# Patient Record
(demographics unavailable — no encounter records)

---

## 2024-11-11 NOTE — HISTORY OF PRESENT ILLNESS
[FreeTextEntry8] : Patient presents as follow-up from clinic exam.  Patient went to the clinic on 11/7/2024, DX with laryngitis/sore throat and given Medrol Dosepak.  Patient states her voice and throat are better, she does feel mucus/PND, no fever.  Patient reports home COVID test prior to clinic exam as negative.  Patient would like to be checked out to ensure she does not need antibiotic.

## 2024-11-11 NOTE — ASSESSMENT
[FreeTextEntry1] : No signs of bacterial infection on today's exam.Patient advised to rest/increase fluids and use supportive therapy. Patient will call if symptoms persist or worsen and return to the office as scheduled for regular followup.   Dr. Terry was present in office building while I examined patient

## 2025-01-08 NOTE — HISTORY OF PRESENT ILLNESS
[FreeTextEntry8] : Patient presents complaining of chest tightness for about 5 days.  Patient feels it in the upper portion of her chest, just below her throat area.  Patient states "Am I havind a heart attack".  Patient states about 2 hours ago she developed mild and insignificant cough.  Patient denies dyspnea/calf pain/edema.  Patient does have anxiety and is on Zoloft with lorazepam as needed but does not feel this is anxiety related.  Patient does get GERD of which she takes pantoprazole as needed but this does not feel like GERD.  Patient has noticed no rash in the area.  Patient states she is still working out and going to the gym without any issue.  Patient feels symptoms more obvious at rest when she is sitting still

## 2025-01-08 NOTE — ASSESSMENT
[FreeTextEntry1] : Venipuncture done in our office, Labs sent out.  EKG shows no acute change.  No signs of PE/hypoxia.  Chest x-ray ordered.  Further recommendations pending chest x-ray and blood work.  Patient will return to the office for CP when applicable/as scheduled  Attending MD available by phone if needed

## 2025-01-08 NOTE — PHYSICAL EXAM
[No Acute Distress] : no acute distress [No Respiratory Distress] : no respiratory distress  [Clear to Auscultation] : lungs were clear to auscultation bilaterally [Normal Rate] : normal rate  [Regular Rhythm] : with a regular rhythm [Normal Affect] : the affect was normal [Normal Mood] : the mood was normal [No Edema] : there was no peripheral edema [Soft] : abdomen soft [Non Tender] : non-tender [de-identified] : + Discomfort to palpation upper sternal area, no bony deformity [de-identified] : Negative Homans [de-identified] : No rash to chest

## 2025-01-14 NOTE — PHYSICAL EXAM
[Chaperone Present] : A chaperone was present in the examining room during all aspects of the physical examination [[id number]] : A chaperone was present during the pelvic exam. [Appropriately responsive] : appropriately responsive [Alert] : alert [No Acute Distress] : no acute distress [Soft] : soft [No Lesions] : no lesions [Oriented x3] : oriented x3 [Labia Majora] : normal [Labia Minora] : normal [Normal] : normal [Uterine Adnexae] : normal

## 2025-01-14 NOTE — HISTORY OF PRESENT ILLNESS
[Oral Contraceptive] : uses oral contraception pills [Y] : Patient is sexually active [N] : Patient denies prior pregnancies [Regular Cycle Intervals] : periods have been regular [Frequency: Q ___ days] : menstrual periods occur approximately every [unfilled] days [Menarche Age: ____] : age at menarche was [unfilled] [FreeTextEntry1] : Audrey Reyna is a 31-year-old para 0 LMP (12/19/24) here for GYN annual  Last seen by [facility] provider in 2024 for routine annual. No acute gyn concerns at this time. Reports regular menses. Denies any pelvic pain or urinary concerns. Not currently in sexual relationship, previously with male partner. Uses condoms and [employer] Works as   Screening: - Pap (1/12024): NILM , HPV NEG   ObHx: denies GynHx: Menses qmo, lasting 3-4days. Hx of gonorrhea. Denies hx of ovarian cysts, hx of fibroids, hx of endometriosis PMH:  bilateral breast fibroadenoma. PSH: Tonsillectomy, ankle surgery ALL: NKDA Meds: tri-estarylla SocHx: Lives with family. Feels safe at home. Denies depression or anxiety related symptoms. Never used tobacco products, denies illicit drug use/EtOH. FamHx: no significant family history of GYN malignancy or disease  [PGHxTotal] : 0 [PGHxFullTerm] : 0 [PGHxPremature] : 0 [PGHxAbortions] : 0 [[facility]] : 0 [PGHxABInduced] : 0 [PGHxABSpont] : 0 [PGHxEctopic] : 0 [PGHxMultBirths] : 0

## 2025-01-14 NOTE — DISCUSSION/SUMMARY
[FreeTextEntry1] : Audrey Reyna is a 31-year-old para 0 LMP (12/19/24) here for GYN annual  PCP: Dr. Terry    #Well Woman Visit  1. Nutrition/Activity: The benefits of physical activity and balanced diet.  2. Health Screening: She was informed of the benefits of a screening Pap. - Cervix: We reviewed ASCCP/ACOG guidelines for pap smear screening. PAP collected today. 3. Sex Health: The importance of safe-sex practices was discussed with the patient. STD screening was offered to patient, she declines as she is not sexually active. 4. Contraception: Would like to c/w tri-estarylla. Denies any contraindications to use, refill placed 5. Denies any hx of DM/HTN 6. Hx of breast fibroadenoma, Right breast US 7/2024-unchanged right breast nodule--recommended f/u in 6 mo, has appt scheduled for next week  7. Has received Gardasil vaccination series       She verbalized understanding and agreement with above counseling regarding differential diagnosis, evaluation, and plan. She was given time for questions/concerns which were all answered to her apparent satisfaction.    RTO in 1 yr for annual

## 2025-01-14 NOTE — PHYSICAL EXAM
[Chaperone Present] : A chaperone was present in the examining room during all aspects of the physical examination [[id number]] : A chaperone was present during the pelvic exam. [Appropriately responsive] : appropriately responsive [Alert] : alert [No Acute Distress] : no acute distress [Soft] : soft [No Lesions] : no lesions [Oriented x3] : oriented x3 [Labia Majora] : normal [Labia Minora] : normal [Normal] : normal [Uterine Adnexae] : normal

## 2025-02-05 NOTE — PHYSICAL EXAM
[No Acute Distress] : no acute distress [Normal Sclera/Conjunctiva] : normal sclera/conjunctiva [PERRL] : pupils equal round and reactive to light [EOMI] : extraocular movements intact [Normal Outer Ear/Nose] : the outer ears and nose were normal in appearance [Normal Oropharynx] : the oropharynx was normal [Normal TMs] : both tympanic membranes were normal [Normal Nasal Mucosa] : the nasal mucosa was normal [No Lymphadenopathy] : no lymphadenopathy [Supple] : supple [No Respiratory Distress] : no respiratory distress  [Clear to Auscultation] : lungs were clear to auscultation bilaterally [Normal Rate] : normal rate  [Regular Rhythm] : with a regular rhythm [Pedal Pulses Present] : the pedal pulses are present [No Edema] : there was no peripheral edema [No Extremity Clubbing/Cyanosis] : no extremity clubbing/cyanosis [Soft] : abdomen soft [Non Tender] : non-tender [Non-distended] : non-distended [No Masses] : no abdominal mass palpated [Normal Bowel Sounds] : normal bowel sounds [No CVA Tenderness] : no CVA  tenderness [No Spinal Tenderness] : no spinal tenderness [No Joint Swelling] : no joint swelling [Grossly Normal Strength/Tone] : grossly normal strength/tone [No Skin Lesions] : no skin lesions [No Focal Deficits] : no focal deficits [Normal Affect] : the affect was normal [Normal Mood] : the mood was normal [de-identified] : via gyn [de-identified] : VIA GYN

## 2025-02-05 NOTE — HISTORY OF PRESENT ILLNESS
[de-identified] : 31-year-old female presents to practice Medical history includes -Anxiety on Zoloft with lorazepam as needed, no depression, following with psychiatry -Fibroadenoma bilateral breasts, sono 1/2025 -BMI in overweight category -History of COVID -GERD=Protonix as needed -Dairy intolerance= Lactaid as needed -History of left ankle surgery -tinea versicolor s/p OTC tx in past -History of ovarian cysts -History of gonorrhea -History of T & A    -Continues on birth control -Recent Pap test with GYN showed ASCUS, they will continue to monitor patient -See prior note as patient was seen on 1/8/2025 complaining of chest tightness, chest x-ray/EKG and D-dimer were negative.  GERD controlled with diet and pantoprazole as needed.  Patient does not feel it is anxiety related.  Patient states it is better but still there periodically

## 2025-02-05 NOTE — HEALTH RISK ASSESSMENT
[Very Good] : ~his/her~  mood as very good [2 - 4 times a month (2 pts)] : 2-4 times a month (2 points) [1 or 2 (0 pts)] : 1 or 2 (0 points) [Never (0 pts)] : Never (0 points) [Yes] : In the past 12 months have you used drugs other than those required for medical reasons? Yes [No falls in past year] : Patient reported no falls in the past year [0] : 2) Feeling down, depressed, or hopeless: Not at all (0) [Never] : Never [Alone] : lives alone [Employed] : employed [Graduate School] : graduate school [Single] : single [Sexually Active] : sexually active [Feels Safe at Home] : Feels safe at home [Fully functional (bathing, dressing, toileting, transferring, walking, feeding)] : Fully functional (bathing, dressing, toileting, transferring, walking, feeding) [Fully functional (using the telephone, shopping, preparing meals, housekeeping, doing laundry, using] : Fully functional and needs no help or supervision to perform IADLs (using the telephone, shopping, preparing meals, housekeeping, doing laundry, using transportation, managing medications and managing finances) [Smoke Detector] : smoke detector [Carbon Monoxide Detector] : carbon monoxide detector [Seat Belt] :  uses seat belt [Sunscreen] : uses sunscreen [Good] : ~his/her~  mood as  good [Audit-CScore] : 2 points [de-identified] : occ marijuana [FreeTextEntry2] : teacher [de-identified] : +masters

## 2025-02-05 NOTE — HISTORY OF PRESENT ILLNESS
[de-identified] : 31-year-old female presents to practice Medical history includes -Anxiety on Zoloft with lorazepam as needed, no depression, following with psychiatry -Fibroadenoma bilateral breasts, sono 1/2025 -BMI in overweight category -History of COVID -GERD=Protonix as needed -Dairy intolerance= Lactaid as needed -History of left ankle surgery -tinea versicolor s/p OTC tx in past -History of ovarian cysts -History of gonorrhea -History of T & A    -Continues on birth control -Recent Pap test with GYN showed ASCUS, they will continue to monitor patient -See prior note as patient was seen on 1/8/2025 complaining of chest tightness, chest x-ray/EKG and D-dimer were negative.  GERD controlled with diet and pantoprazole as needed.  Patient does not feel it is anxiety related.  Patient states it is better but still there periodically

## 2025-02-05 NOTE — ASSESSMENT
[FreeTextEntry1] : Cardiology referral given.  Venipuncture done in our office, Labs sent out. Patient advised to continue present medications with diet/exercise and specialists followup.Counseled patient on cigarettes/alcohol/drugs/safe sex practices and encouraged self breast exams. Encouraged routine follow up with dentist /GYN.RTO Q year/prn  TDAP=UTD Flu shot =UTD COVID vax +got it HIV screening 2/2021 Breast sonogram via GYN = 1/2025 MG @ 36 y/o with +FHx Specialists include 1. GYN-Dr. Callahan 2. Dentist + 3.Podiatry-Dr. Wagner 4.GI=Dr. Lanza 5.Psych Q year-Dr. Pacheco 6.Psychology=Paty Ferrari  7.Derm=Dr. Srinivasan endoscopy 3/2023 Colonoscopy at age 45

## 2025-02-05 NOTE — HEALTH RISK ASSESSMENT
[Very Good] : ~his/her~  mood as very good [2 - 4 times a month (2 pts)] : 2-4 times a month (2 points) [1 or 2 (0 pts)] : 1 or 2 (0 points) [Never (0 pts)] : Never (0 points) [Yes] : In the past 12 months have you used drugs other than those required for medical reasons? Yes [No falls in past year] : Patient reported no falls in the past year [0] : 2) Feeling down, depressed, or hopeless: Not at all (0) [Never] : Never [Alone] : lives alone [Employed] : employed [Graduate School] : graduate school [Single] : single [Sexually Active] : sexually active [Feels Safe at Home] : Feels safe at home [Fully functional (bathing, dressing, toileting, transferring, walking, feeding)] : Fully functional (bathing, dressing, toileting, transferring, walking, feeding) [Fully functional (using the telephone, shopping, preparing meals, housekeeping, doing laundry, using] : Fully functional and needs no help or supervision to perform IADLs (using the telephone, shopping, preparing meals, housekeeping, doing laundry, using transportation, managing medications and managing finances) [Smoke Detector] : smoke detector [Carbon Monoxide Detector] : carbon monoxide detector [Seat Belt] :  uses seat belt [Sunscreen] : uses sunscreen [Good] : ~his/her~  mood as  good [Audit-CScore] : 2 points [de-identified] : occ marijuana [FreeTextEntry2] : teacher [de-identified] : +masters

## 2025-02-05 NOTE — PHYSICAL EXAM
[No Acute Distress] : no acute distress [Normal Sclera/Conjunctiva] : normal sclera/conjunctiva [PERRL] : pupils equal round and reactive to light [EOMI] : extraocular movements intact [Normal Outer Ear/Nose] : the outer ears and nose were normal in appearance [Normal Oropharynx] : the oropharynx was normal [Normal TMs] : both tympanic membranes were normal [Normal Nasal Mucosa] : the nasal mucosa was normal [No Lymphadenopathy] : no lymphadenopathy [Supple] : supple [No Respiratory Distress] : no respiratory distress  [Clear to Auscultation] : lungs were clear to auscultation bilaterally [Normal Rate] : normal rate  [Regular Rhythm] : with a regular rhythm [Pedal Pulses Present] : the pedal pulses are present [No Edema] : there was no peripheral edema [No Extremity Clubbing/Cyanosis] : no extremity clubbing/cyanosis [Soft] : abdomen soft [Non Tender] : non-tender [Non-distended] : non-distended [No Masses] : no abdominal mass palpated [Normal Bowel Sounds] : normal bowel sounds [No CVA Tenderness] : no CVA  tenderness [No Spinal Tenderness] : no spinal tenderness [No Joint Swelling] : no joint swelling [Grossly Normal Strength/Tone] : grossly normal strength/tone [No Skin Lesions] : no skin lesions [No Focal Deficits] : no focal deficits [Normal Affect] : the affect was normal [Normal Mood] : the mood was normal [de-identified] : via gyn [de-identified] : VIA GYN

## 2025-02-21 NOTE — PHYSICAL EXAM
[Well Developed] : well developed [Well Nourished] : well nourished [No Acute Distress] : no acute distress [Normal Conjunctiva] : normal conjunctiva [Normal Venous Pressure] : normal venous pressure [No Carotid Bruit] : no carotid bruit [Normal S1, S2] : normal S1, S2 [No Murmur] : no murmur [No Rub] : no rub [No Gallop] : no gallop [Clear Lung Fields] : clear lung fields [Good Air Entry] : good air entry [No Respiratory Distress] : no respiratory distress  [Normal Bowel Sounds] : normal bowel sounds [Normal Gait] : normal gait [No Edema] : no edema [No Cyanosis] : no cyanosis [No Clubbing] : no clubbing [No Varicosities] : no varicosities [Normal Radial B/L] : normal radial B/L [Normal PT B/L] : normal PT B/L [No Rash] : no rash [No Skin Lesions] : no skin lesions [Moves all extremities] : moves all extremities [No Focal Deficits] : no focal deficits [Normal Speech] : normal speech [Alert and Oriented] : alert and oriented [Normal memory] : normal memory

## 2025-02-21 NOTE — ASSESSMENT
[FreeTextEntry1] : 31-year-old female w/ PMHx: Anxiety, fibroadenoma b/l breasts, overweight BMI, GERD< dairy intolerance, hx of ovarian cysts, who presents for cardiovascular evaluation of chest tightness.

## 2025-02-21 NOTE — DISCUSSION/SUMMARY
[EKG obtained to assist in diagnosis and management of assessed problem(s)] : EKG obtained to assist in diagnosis and management of assessed problem(s) [FreeTextEntry1] : Patient presents for: + chest tightness anxiety GERD     Orders placed including imaging/meds:  - 2d TTE - ETT    Patient warm and euvolemic. There is no evidence of active ACS, decompensated HF, uncontrolled arrhythmias or significant valvular heart disease at present. EKG is non-ischemic. We went over the EKG in office today, all questions answered. Vitals reviewed.    I've asked the patient to keep a blood pressure journal and report back if SBP >130 or DBP >90 on 2-3 separate random occasions. The patient is aware of alarm cardiac type symptoms, will call with questions or concerns and is aware to activate 911 and/or present to the closest emergency department if concerns.   Follow up: with me in 2 months or sooner as requested.     I strongly encouraged the patient to pursue the following preventative cardiology, lifestyle modifications which are necessary for long-term cardiovascular health. The following is recommended: Advised a mediterranean and/or plant predominant, high fiber, limited salt (ideal <2 g/day), low fat diet, stress reduction/psychosomatic management, sleep hygiene/ELIE testing and healthy weight loss, annual influenza/preventive vaccines, medication + treatment adherence/compliance, high risk behavior mitigation (I.e. tobacco abstinence, alcohol abstinence, no binge drinking, recreational/illicit drug use abstinence), increased exercise activity aiming for 150 minutes per week of moderate physical activity as per AHA/ACC standard for long term cardiovascular risk reduction.

## 2025-02-21 NOTE — HISTORY OF PRESENT ILLNESS
[FreeTextEntry1] : 31-year-old female w/ PMHx: Anxiety, fibroadenoma b/l breasts, overweight BMI, GERD< dairy intolerance, hx of ovarian cysts, who presents for cardiovascular evaluation of chest tightness.  chest tightness started 2 weeks ago orange theory when exercising feels chest pressure which is new and unusual very athletic. does not occur at rest as much   Occupation: . Family/Support system: parents, half sister pets: dog mac if pets tick bites/scratch with infection: no lyme negative Childhood cardiac history: never   Family cardiac history: maternal grandfather: 60's  Cardiac dx: PHTN sudden syncope/fainting with startle/drowning/exercise: no sudden cardiac death: premature ASCVD:  father: CABG x 2 at 70 years old. arrhythmia/pacemakers/defibrillators: maternal uncle: 911 related Heart failure/enlarged/weak heart/valvular:: no CVA: grandmother 93 PAD: negative    Cardiovascular Lifestyle History: Mediterranean Diet Score (9 question survey) was 6. (8-9: optimal, 6-7: near-optimal, 4-5: suboptimal, 0-3: markedly suboptimal) Exercise: Patient reports exercising at a moderate level for 200 minutes per week. Recreational/illicit drugs: occasional marijuana Alcohol: rare Caffeine: 1 cup Tobacco (vaping/chewing/smoking): no Stress: able to cope with current life stressors. Sleep apnea: never tested, never screened, denies  Medication adherence: adherent AVILA/MAFLD: no OTC/natural: vitamins occasional   Women's Health: LMP: regular use of meds: OCP since 16 Pre-eclampsia/PPCM/HTN: denies never pregnant PCOS: no Mammogram: dense breasts/DAVID: no, but breast ultrasounds, fibroadenoma benign.